# Patient Record
Sex: FEMALE | Race: WHITE | NOT HISPANIC OR LATINO | URBAN - METROPOLITAN AREA
[De-identification: names, ages, dates, MRNs, and addresses within clinical notes are randomized per-mention and may not be internally consistent; named-entity substitution may affect disease eponyms.]

---

## 2018-03-05 ENCOUNTER — OUTPATIENT (OUTPATIENT)
Dept: OUTPATIENT SERVICES | Facility: HOSPITAL | Age: 48
LOS: 1 days | Discharge: HOME | End: 2018-03-05

## 2018-03-05 DIAGNOSIS — R63.4 ABNORMAL WEIGHT LOSS: ICD-10-CM

## 2018-03-05 DIAGNOSIS — A69.20 LYME DISEASE, UNSPECIFIED: ICD-10-CM

## 2019-07-03 ENCOUNTER — OUTPATIENT (OUTPATIENT)
Dept: OUTPATIENT SERVICES | Facility: HOSPITAL | Age: 49
LOS: 1 days | Discharge: HOME | End: 2019-07-03

## 2019-07-03 DIAGNOSIS — D51.0 VITAMIN B12 DEFICIENCY ANEMIA DUE TO INTRINSIC FACTOR DEFICIENCY: ICD-10-CM

## 2019-07-03 DIAGNOSIS — Z00.00 ENCOUNTER FOR GENERAL ADULT MEDICAL EXAMINATION WITHOUT ABNORMAL FINDINGS: ICD-10-CM

## 2019-07-08 ENCOUNTER — OUTPATIENT (OUTPATIENT)
Dept: OUTPATIENT SERVICES | Facility: HOSPITAL | Age: 49
LOS: 1 days | Discharge: HOME | End: 2019-07-08

## 2019-07-08 DIAGNOSIS — N39.0 URINARY TRACT INFECTION, SITE NOT SPECIFIED: ICD-10-CM

## 2020-03-26 ENCOUNTER — TRANSCRIPTION ENCOUNTER (OUTPATIENT)
Age: 50
End: 2020-03-26

## 2022-04-07 PROBLEM — Z00.00 ENCOUNTER FOR PREVENTIVE HEALTH EXAMINATION: Status: ACTIVE | Noted: 2022-04-07

## 2022-07-19 ENCOUNTER — APPOINTMENT (OUTPATIENT)
Dept: PAIN MANAGEMENT | Facility: CLINIC | Age: 52
End: 2022-07-19

## 2022-07-28 ENCOUNTER — APPOINTMENT (OUTPATIENT)
Dept: ENDOCRINOLOGY | Facility: CLINIC | Age: 52
End: 2022-07-28

## 2022-07-28 ENCOUNTER — TRANSCRIPTION ENCOUNTER (OUTPATIENT)
Age: 52
End: 2022-07-28

## 2022-07-28 VITALS
HEART RATE: 72 BPM | DIASTOLIC BLOOD PRESSURE: 66 MMHG | OXYGEN SATURATION: 97 % | HEIGHT: 62 IN | BODY MASS INDEX: 15.64 KG/M2 | SYSTOLIC BLOOD PRESSURE: 104 MMHG | RESPIRATION RATE: 18 BRPM | WEIGHT: 85 LBS | TEMPERATURE: 97.4 F

## 2022-07-28 DIAGNOSIS — F32.A DEPRESSION, UNSPECIFIED: ICD-10-CM

## 2022-07-28 DIAGNOSIS — Z82.49 FAMILY HISTORY OF ISCHEMIC HEART DISEASE AND OTHER DISEASES OF THE CIRCULATORY SYSTEM: ICD-10-CM

## 2022-07-28 DIAGNOSIS — R63.0 ANOREXIA: ICD-10-CM

## 2022-07-28 DIAGNOSIS — Z78.9 OTHER SPECIFIED HEALTH STATUS: ICD-10-CM

## 2022-07-28 DIAGNOSIS — F17.200 NICOTINE DEPENDENCE, UNSPECIFIED, UNCOMPLICATED: ICD-10-CM

## 2022-07-28 DIAGNOSIS — F45.42 PAIN DISORDER WITH RELATED PSYCHOLOGICAL FACTORS: ICD-10-CM

## 2022-07-28 DIAGNOSIS — Z81.8 FAMILY HISTORY OF OTHER MENTAL AND BEHAVIORAL DISORDERS: ICD-10-CM

## 2022-07-28 DIAGNOSIS — Z83.511 FAMILY HISTORY OF GLAUCOMA: ICD-10-CM

## 2022-07-28 DIAGNOSIS — K21.00 GASTRO-ESOPHAGEAL REFLUX DISEASE WITH ESOPHAGITIS, WITHOUT BLEEDING: ICD-10-CM

## 2022-07-28 DIAGNOSIS — Z87.11 PERSONAL HISTORY OF PEPTIC ULCER DISEASE: ICD-10-CM

## 2022-07-28 DIAGNOSIS — F41.9 ANXIETY DISORDER, UNSPECIFIED: ICD-10-CM

## 2022-07-28 DIAGNOSIS — Z83.3 FAMILY HISTORY OF DIABETES MELLITUS: ICD-10-CM

## 2022-07-28 DIAGNOSIS — Z83.438 FAMILY HISTORY OF OTHER DISORDER OF LIPOPROTEIN METABOLISM AND OTHER LIPIDEMIA: ICD-10-CM

## 2022-07-28 DIAGNOSIS — Z80.9 FAMILY HISTORY OF MALIGNANT NEOPLASM, UNSPECIFIED: ICD-10-CM

## 2022-07-28 DIAGNOSIS — M81.0 AGE-RELATED OSTEOPOROSIS W/OUT CURRENT PATHOLOGICAL FRACTURE: ICD-10-CM

## 2022-07-28 PROCEDURE — 99204 OFFICE O/P NEW MOD 45 MIN: CPT

## 2022-07-28 RX ORDER — VALACYCLOVIR 500 MG/1
500 TABLET, FILM COATED ORAL
Qty: 60 | Refills: 0 | Status: ACTIVE | COMMUNITY
Start: 2022-06-14

## 2022-07-28 RX ORDER — AMOXICILLIN 250 MG/1
250 CAPSULE ORAL
Qty: 21 | Refills: 0 | Status: COMPLETED | COMMUNITY
Start: 2022-07-12

## 2022-07-28 RX ORDER — CLONAZEPAM 2 MG/1
2 TABLET ORAL
Qty: 30 | Refills: 0 | Status: ACTIVE | COMMUNITY
Start: 2022-02-08

## 2022-07-28 RX ORDER — ALENDRONATE SODIUM 70 MG/1
70 TABLET ORAL
Qty: 12 | Refills: 0 | Status: ACTIVE | COMMUNITY
Start: 2022-06-21

## 2022-07-28 RX ORDER — LEVOMILNACIPRAN HYDROCHLORIDE 120 MG/1
120 CAPSULE, EXTENDED RELEASE ORAL
Qty: 30 | Refills: 0 | Status: COMPLETED | COMMUNITY
Start: 2022-06-23

## 2022-07-28 RX ORDER — LAMOTRIGINE 25 MG/1
25 TABLET ORAL
Qty: 90 | Refills: 0 | Status: COMPLETED | COMMUNITY
Start: 2022-06-09

## 2022-07-28 RX ORDER — BUSPIRONE HYDROCHLORIDE 10 MG/1
10 TABLET ORAL
Qty: 60 | Refills: 0 | Status: ACTIVE | COMMUNITY
Start: 2022-02-10

## 2022-07-28 RX ORDER — TRAZODONE HYDROCHLORIDE 50 MG/1
50 TABLET ORAL
Qty: 60 | Refills: 0 | Status: COMPLETED | COMMUNITY
Start: 2022-02-01

## 2022-07-28 RX ORDER — BREXPIPRAZOLE 1 MG/1
1 TABLET ORAL
Qty: 30 | Refills: 0 | Status: COMPLETED | COMMUNITY
Start: 2022-01-20

## 2022-07-28 RX ORDER — SUCRALFATE 1 G/1
1 TABLET ORAL
Qty: 120 | Refills: 0 | Status: ACTIVE | COMMUNITY
Start: 2022-01-20

## 2022-07-28 RX ORDER — ACETAMINOPHEN AND CODEINE 300; 30 MG/1; MG/1
300-30 TABLET ORAL
Qty: 10 | Refills: 0 | Status: COMPLETED | COMMUNITY
Start: 2022-07-12

## 2022-07-28 RX ORDER — LEVOMILNACIPRAN HYDROCHLORIDE 120 MG/1
120 CAPSULE, EXTENDED RELEASE ORAL
Refills: 0 | Status: ACTIVE | COMMUNITY

## 2022-07-28 RX ORDER — PANTOPRAZOLE 40 MG/1
40 TABLET, DELAYED RELEASE ORAL
Qty: 30 | Refills: 0 | Status: ACTIVE | COMMUNITY
Start: 2022-02-14

## 2022-07-28 RX ORDER — FLUCONAZOLE 150 MG/1
150 TABLET ORAL
Qty: 2 | Refills: 0 | Status: COMPLETED | COMMUNITY
Start: 2022-01-26

## 2022-07-28 RX ORDER — AMOXICILLIN 500 MG/1
500 TABLET, FILM COATED ORAL
Qty: 28 | Refills: 0 | Status: COMPLETED | COMMUNITY
Start: 2022-06-30

## 2022-07-28 RX ORDER — CEPHALEXIN 500 MG/1
500 CAPSULE ORAL
Qty: 21 | Refills: 0 | Status: COMPLETED | COMMUNITY
Start: 2022-02-21

## 2022-07-28 NOTE — PHYSICAL EXAM
[Alert] : alert [No Acute Distress] : no acute distress [Well Developed] : well developed [Normal Sclera/Conjunctiva] : normal sclera/conjunctiva [EOMI] : extra ocular movement intact [No Proptosis] : no proptosis [PERRL] : pupils equal, round and reactive to light [Normal Outer Ear/Nose] : the ears and nose were normal in appearance [Normal Hearing] : hearing was normal [No Neck Mass] : no neck mass was observed [Supple] : the neck was supple [No Respiratory Distress] : no respiratory distress [No Accessory Muscle Use] : no accessory muscle use [Normal Rate and Effort] : normal respiratory rate and effort [Clear to Auscultation] : lungs were clear to auscultation bilaterally [Normal S1, S2] : normal S1 and S2 [Normal Rate] : heart rate was normal [Regular Rhythm] : with a regular rhythm [Carotids Normal] : carotid pulses were normal with no bruits [No Edema] : no peripheral edema [Pedal Pulses Normal] : the pedal pulses are present [Normal Bowel Sounds] : normal bowel sounds [Not Tender] : non-tender [Not Distended] : not distended [Soft] : abdomen soft [Normal Anterior Cervical Nodes] : no anterior cervical lymphadenopathy [Normal Posterior Cervical Nodes] : no posterior cervical lymphadenopathy [No CVA Tenderness] : no ~M costovertebral angle tenderness [No Spinal Tenderness] : no spinal tenderness [Spine Straight] : spine straight [Kyphosis] : no kyphosis present [Scoliosis] : scoliosis present [No Stigmata of Cushings Syndrome] : no stigmata of Cushings Syndrome [Normal Gait] : normal gait [Normal Strength/Tone] : muscle strength and tone were normal [No Rash] : no rash [Acanthosis Nigricans] : no acanthosis nigricans [Cranial Nerves Intact] : cranial nerves 2-12 were intact [No Motor Deficits] : the motor exam was normal [Normal Reflexes] : deep tendon reflexes were 2+ and symmetric [No Tremors] : no tremors [Oriented x3] : oriented to person, place, and time [Normal Affect] : the affect was normal [Normal Mood] : the mood was normal [de-identified] : thin [de-identified] : osteoporosis

## 2022-07-28 NOTE — HISTORY OF PRESENT ILLNESS
[Calcium (dietary)] : dietary Calcium [Vitamin D (sun exposure)] : Vitamin D from sun exposure [Alendronate (Fosomax)] : Alendronate [None] : The patient is not currently taking any medication for this problem. [Low Calcium Intake] : low calcium intake [Low Vit D Intake/Exposure] : low vitamin D intake [Premat. Menopause (natural)] : a history of premature menopause [Amenorrhea] : amenorrhea [Disordered Eating] : history of disordered eating [Sedentary] : a sedentary lifestyle [Current Smoking___(ppd)] : smoking [unfilled] ~Upacks per day [Regular Dental Follow-Up] : regular dental follow-up [FreeTextEntry1] : Patient had dexa scan and osteoporosis- Patient menopause at 42 years. Has history of anorexia x 30 years. \par Past 3 years under 90 pounds. Patient noted she pain when she eats, saw GI no ulcers at present time. \par Patient stated has Fosamax medication, but did not start medication yet. Discussed diet and she mentioned she eats when she is with company of outside. Discussed need to eat and increase calcium and vitamin D and weight bearing exercise. Discussed she needs to take the Fosamax weekly and to maintain upright x 4 hours after taking the medication.\par Patient stated she has a 16 year old daughter.\par  [Hyperparathyroidism] : no history of hyperparathyroidism [Diabetes] : no history of diabetes [Excessive Alcohol Intake] : no excessive alcohol intake [Excessive Soda] : no excessive soda [Family History of Breast Cancer] : no family history of breast cancer [Previous Fragility Fracture] : no previous fragility fracture [Family History of Hip Fracture] : no family history of hip fracture [Family History of Osteoporosis] : no family history of osteoporosis [History of Blood Clots] : no history of blood clots [History of Radiation Therapy] : no history of radiation therapy [High Fall Risk] : no fall risk [Uses a Walker/Cane] : no use of a walker/cane

## 2022-07-28 NOTE — RESULTS/DATA
[L1 - L4] : L1 - L4 [BMD ___ g/cm2] : BMD: [unfilled] g/cm2 [T-Score ___] : T-score: [unfilled] [Z-Score ___] : Z-score: [unfilled] [FreeTextEntry2] : 6/13/22 [FreeTextEntry1] : h/o anorexia, bulimia, complex regional pain syndrome right hand, menopause age 42, drinks a lot of coffee

## 2022-07-28 NOTE — DATA REVIEWED
[No studies available for review at this time.] : No studies available for review at this time. [FreeTextEntry1] : 6/22/21 chol 189, hdl 63, trig 102, ldl 115, hgba 1c

## 2022-07-28 NOTE — REVIEW OF SYSTEMS
[Fatigue] : fatigue [Decreased Appetite] : decreased appetite [Recent Weight Loss (___ Lbs)] : recent weight loss: [unfilled] lbs [Abdominal Pain] : abdominal pain [Heartburn] : heartburn [Joint Pain] : joint pain [Hair Loss] : hair loss [Dizziness] : dizziness [Pain/Numbness of Digits] : pain/numbness of digits [Depression] : depression [Insomnia] : insomnia [Anxiety] : anxiety [Stress] : stress [Negative] : Heme/Lymph [Suicidal Ideation] : no suicidal ideation [Homicidal Ideation] : no homicidal ideation [FreeTextEntry9] : right upper extremity, carpal tunnel, pain in both legs aching [de-identified] : anorexia [de-identified] : osteoporosis

## 2022-07-28 NOTE — REASON FOR VISIT
[Initial Evaluation] : an initial evaluation [Osteoporosis] : osteoporosis [FreeTextEntry2] : Dr. LUCERO

## 2022-07-28 NOTE — ASSESSMENT
[Bisphosphonate Therapy] : Risks and benefits of bisphosphonate therapy were  discussed with the patient including gastroesophageal irritation, osteonecrosis of the jaw, and atypical femur fractures, and acute phase reaction [Bisphosphonates] : The patient was instructed to take bisphosphonates on an empty stomach with a full glass of water,and wait at least 30 minutes before eating or lying down [FreeTextEntry1] : patient teaching of diet, need to eat food high in calories, need to decrease caffeine intake and increase calcium and vitamin d. Patient taking pantoprazole and sucralfate, may interfere in calcium absorption.

## 2022-07-28 NOTE — PAST MEDICAL HISTORY
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

## 2022-08-04 ENCOUNTER — APPOINTMENT (OUTPATIENT)
Dept: PAIN MANAGEMENT | Facility: CLINIC | Age: 52
End: 2022-08-04

## 2022-08-04 VITALS
WEIGHT: 85 LBS | HEIGHT: 62 IN | BODY MASS INDEX: 15.64 KG/M2 | HEART RATE: 97 BPM | SYSTOLIC BLOOD PRESSURE: 101 MMHG | DIASTOLIC BLOOD PRESSURE: 69 MMHG

## 2022-08-04 DIAGNOSIS — M79.675 PAIN IN LEFT TOE(S): ICD-10-CM

## 2022-08-04 PROCEDURE — 80305 DRUG TEST PRSMV DIR OPT OBS: CPT | Mod: QW

## 2022-08-04 PROCEDURE — 99214 OFFICE O/P EST MOD 30 MIN: CPT

## 2022-08-04 PROCEDURE — 99072 ADDL SUPL MATRL&STAF TM PHE: CPT

## 2022-08-04 NOTE — PHYSICAL EXAM
[de-identified] : Constitutional:  The patient appears well developed, well nourished.  Examination of patients ability to communicate functionally was normal. \par \par Skin:  Skin of the head and face is normal without rashes, lesions or ulcers. Skin of the right upper extremity is normal without rashes, lesions or ulcers.  Skin of the left lower extremities is normal without rashes, lesions, or ulcers. Skin of the right lower extremity is normal without rashes, lesions or ulcers.  Skin of the left upper extremity is normal without rashes, lesions, or ulcers. \par \par Neurologic: Coordination is normal.  normal.  Alert and oriented to time, place and person.  Grossly intact.  No evidence of mood disorder, calm affect. \par \par Right Hand: Inspection of the hand/wrist is as follows: Right hand is swollen and color discoloration with hyperalgesia and allodynia. Severe decreased ROM.

## 2022-08-04 NOTE — DISCUSSION/SUMMARY
[de-identified] : PLAN: This is a 51 yo F who suffers from chronic pain within the right upper extremity. She had failed SCS and does not wish to re-trial high frequency. WE will continue Gabapentin at a dosage of 300mg PO 1qAM 2qPM. She has not been able to work. She would like to retrial medical marijuana, rx was reinstated. UDS completed today and consistent. She will follow up in 3 months. \par \par Claudia Dick, MS PA-C\par Satish Soni,   \par Diplomate, American Board of Anesthesiology  \par Diplomate, American Board of Pain Medicine\par \par

## 2022-08-04 NOTE — HISTORY OF PRESENT ILLNESS
[FreeTextEntry1] : ORIGINAL PRESENTATION: Ms. Amaya had a work-related injury on May 7, 2013, where she worked as a , her hand went numb. She had carpal tunnel release surgery. Since then she had surgery on the right hand and developed complex regional syndrome and now has left hand numbness due to carpal tunnel, that is chronic. She has chronic neuropathy in her left hand and chronic complex regional pain syndrome, difficult problem. \par \par She recently initiated therapy with medical marijuana sublingual 0.5ml once a day, she notes improved sleeping habits. Minor relief with pain but it is getting better.\par \par ACTIVITIES: No real problems with walking, sitting, or standing. Never lies down. Uses no assistive device. Has difficultly performing household chores, doing shopping, socializing with friends, participating in activities and exercising.\par \par TODAY:: She presents for follow up regarding within the right upper extremity. The site is red at this time, tender to touch, with peeling to the skin noted. Amitriptyline and Lyrica failed to provide relief and she remains hesitant to consider SCS. With that, I have agreed to resume her gabapentin dosing. She gabapentin 300mg TID dosing to aid with pain control and quality of life. She is agreeable to trial this change and I will revisit with her in 2-3 months for continued care/treatment efforts.  We discussed re-trialing medical marijuana now that there are more variations available including edibles, topicals and vape. Previously when she tried it she did not like the sublingual oil. \par \par Of note she was recently diagnosed with osteoporosis and put on fosamax. She fell recently and injured her left 4th toe. \par \par UDS completed today 8/4/22

## 2022-08-12 LAB
24R-OH-CALCIDIOL SERPL-MCNC: 40.8 PG/ML
ALBUMIN SERPL ELPH-MCNC: 4.7 G/DL
ALP BLD-CCNC: 60 U/L
ALT SERPL-CCNC: 14 U/L
ANION GAP SERPL CALC-SCNC: 14 MMOL/L
APPEARANCE: ABNORMAL
AST SERPL-CCNC: 27 U/L
BACTERIA UR CULT: NORMAL
BASOPHILS # BLD AUTO: 0.07 K/UL
BASOPHILS NFR BLD AUTO: 0.8 %
BILIRUB SERPL-MCNC: 0.4 MG/DL
BILIRUBIN URINE: NEGATIVE
BLOOD URINE: NEGATIVE
BUN SERPL-MCNC: 14 MG/DL
CALCIUM SERPL-MCNC: 10.1 MG/DL
CALCIUM SERPL-MCNC: 9.9 MG/DL
CHLORIDE SERPL-SCNC: 104 MMOL/L
CHOLEST SERPL-MCNC: 201 MG/DL
CO2 SERPL-SCNC: 26 MMOL/L
COLOR: YELLOW
CREAT SERPL-MCNC: 0.9 MG/DL
EGFR: 78 ML/MIN/1.73M2
EOSINOPHIL # BLD AUTO: 0.25 K/UL
EOSINOPHIL NFR BLD AUTO: 2.9 %
ESTIMATED AVERAGE GLUCOSE: 108 MG/DL
FOLATE SERPL-MCNC: >20 NG/ML
GLUCOSE QUALITATIVE U: NEGATIVE
GLUCOSE SERPL-MCNC: 86 MG/DL
HBA1C MFR BLD HPLC: 5.4 %
HCT VFR BLD CALC: 41.3 %
HDLC SERPL-MCNC: 65 MG/DL
HGB BLD-MCNC: 13.3 G/DL
IMM GRANULOCYTES NFR BLD AUTO: 0.4 %
KETONES URINE: NEGATIVE
LDLC SERPL CALC-MCNC: 109 MG/DL
LEUKOCYTE ESTERASE URINE: NEGATIVE
LYMPHOCYTES # BLD AUTO: 2.2 K/UL
LYMPHOCYTES NFR BLD AUTO: 25.8 %
MAN DIFF?: NORMAL
MCHC RBC-ENTMCNC: 31.7 PG
MCHC RBC-ENTMCNC: 32.2 G/DL
MCV RBC AUTO: 98.6 FL
MONOCYTES # BLD AUTO: 0.45 K/UL
MONOCYTES NFR BLD AUTO: 5.3 %
NEUTROPHILS # BLD AUTO: 5.52 K/UL
NEUTROPHILS NFR BLD AUTO: 64.8 %
NITRITE URINE: NEGATIVE
NONHDLC SERPL-MCNC: 136 MG/DL
PARATHYROID HORMONE INTACT: 35 PG/ML
PH URINE: 6
PHOSPHATE SERPL-MCNC: 3.8 MG/DL
PLATELET # BLD AUTO: 304 K/UL
POTASSIUM SERPL-SCNC: 4.8 MMOL/L
PROT SERPL-MCNC: 7.4 G/DL
PROTEIN URINE: NORMAL
RBC # BLD: 4.19 M/UL
RBC # FLD: 13.3 %
SODIUM SERPL-SCNC: 144 MMOL/L
SPECIFIC GRAVITY URINE: 1.02
T4 FREE SERPL-MCNC: 1 NG/DL
THYROGLOB AB SERPL-ACNC: <20 IU/ML
THYROPEROXIDASE AB SERPL IA-ACNC: <10 IU/ML
TRIGL SERPL-MCNC: 137 MG/DL
TSH SERPL-ACNC: 1.39 UIU/ML
UROBILINOGEN URINE: NORMAL
VIT B12 SERPL-MCNC: 1170 PG/ML
WBC # FLD AUTO: 8.52 K/UL

## 2022-11-01 ENCOUNTER — APPOINTMENT (OUTPATIENT)
Dept: ENDOCRINOLOGY | Facility: CLINIC | Age: 52
End: 2022-11-01

## 2022-11-09 ENCOUNTER — LABORATORY RESULT (OUTPATIENT)
Age: 52
End: 2022-11-09

## 2022-11-09 ENCOUNTER — APPOINTMENT (OUTPATIENT)
Dept: PAIN MANAGEMENT | Facility: CLINIC | Age: 52
End: 2022-11-09

## 2022-11-09 PROCEDURE — 99072 ADDL SUPL MATRL&STAF TM PHE: CPT

## 2022-11-09 PROCEDURE — 99214 OFFICE O/P EST MOD 30 MIN: CPT

## 2022-11-09 NOTE — PHYSICAL EXAM
[de-identified] : GENERAL APPEARANCE OF PATIENT IS WELL DEVELOPED, WELL NOURISHED, BODY HABITUS NORMAL, WELL GROOMED, NO DEFORMITIES NOTED. \par Head - Atraumatic and Normocephalic \par Eyes, Nose, and Throat: External inspection of ears and nose are normal overall without scars, lesions, or masses noted. Assessment of hearing is normal\par Neck-Examination of neck shows no masses, overall appearance is normal, neck is symmetric, tracheal position is midline, no crepitus is noted. Examination of thyroid shows no enlargement, tenderness or masses\par Respiratory- Assessment of respiratory effort shows no intercostal retractions, no use of accessory muscles, unlabored breathing, and normal diaphragmatic movement.\par Cardiovascular- Examination of extremities show no edema or varicosities\par Musculoskeletal. Examination of gait is not antalgic and station is normal\par Inspection and palpation of digits and nails shows no clubbing, cyanosis, nodules, drainage, fluctuance, petechiae\par \par • Spine – inspection and palpation shows no misalignment, asymmetry, crepitation, defects, tenderness, masses, effusions. ROM is normal without crepitation or contracture. No instability or subluxation or laxity is noted. No abnormal movements.\par \par \par • Neck- inspection and palpation shows no misalignment, asymmetry, crepitation, defects, tenderness, masses, effusions. ROM is normal without crepitation or contracture. No instability or subluxation or laxity is noted. No abnormal movements.\par \par \par • RUE- Right Hand: Inspection of the hand/wrist is as follows: Right hand is swollen and color discoloration with hyperalgesia and allodynia. Severe decreased ROM.\par \par \par • LUE- inspection and palpation shows no misalignment, asymmetry, crepitation, defects, tenderness, masses, effusions. ROM is normal without crepitation or contracture. No instability or subluxation or laxity is noted. No abnormal movements.\par \par \par • RLE- inspection and palpation shows no misalignment, asymmetry, crepitation, defects, tenderness, masses, effusions. ROM is normal without crepitation or contracture. No instability or subluxation or laxity is noted. No abnormal movements.\par \par \par • LLE- inspection and palpation shows no misalignment, asymmetry, crepitation, defects, tenderness, masses, effusions. ROM is normal without crepitation or contracture. No instability or subluxation or laxity is noted. No abnormal movements.\par \par \par • Skin- Inspection of skin and subcutaneous tissue shows no rashes, lesions or ulcers. Palpation of skin and subcutaneous tissue shows no rashes, no indurations, subcutaneous nodules or tightening.\par \par \par • Abdomen- Nontender\par \par \par • Neurologic- CN 2-12 are grossly intact. No sensory or motor deficits in the upper and lower extremities. Adequate strength in upper and lower extremities \par \par \par • Psychiatric- Patient’s judgment and insight are intact. Oriented to time, place and person. Recent and remote memory intact.\par \par \par

## 2022-11-09 NOTE — PHYSICAL EXAM
[de-identified] : GENERAL APPEARANCE OF PATIENT IS WELL DEVELOPED, WELL NOURISHED, BODY HABITUS NORMAL, WELL GROOMED, NO DEFORMITIES NOTED. \par Head - Atraumatic and Normocephalic \par Eyes, Nose, and Throat: External inspection of ears and nose are normal overall without scars, lesions, or masses noted. Assessment of hearing is normal\par Neck-Examination of neck shows no masses, overall appearance is normal, neck is symmetric, tracheal position is midline, no crepitus is noted. Examination of thyroid shows no enlargement, tenderness or masses\par Respiratory- Assessment of respiratory effort shows no intercostal retractions, no use of accessory muscles, unlabored breathing, and normal diaphragmatic movement.\par Cardiovascular- Examination of extremities show no edema or varicosities\par Musculoskeletal. Examination of gait is not antalgic and station is normal\par Inspection and palpation of digits and nails shows no clubbing, cyanosis, nodules, drainage, fluctuance, petechiae\par \par • Spine – inspection and palpation shows no misalignment, asymmetry, crepitation, defects, tenderness, masses, effusions. ROM is normal without crepitation or contracture. No instability or subluxation or laxity is noted. No abnormal movements.\par \par \par • Neck- inspection and palpation shows no misalignment, asymmetry, crepitation, defects, tenderness, masses, effusions. ROM is normal without crepitation or contracture. No instability or subluxation or laxity is noted. No abnormal movements.\par \par \par • RUE- Right Hand: Inspection of the hand/wrist is as follows: Right hand is swollen and color discoloration with hyperalgesia and allodynia. Severe decreased ROM.\par \par \par • LUE- inspection and palpation shows no misalignment, asymmetry, crepitation, defects, tenderness, masses, effusions. ROM is normal without crepitation or contracture. No instability or subluxation or laxity is noted. No abnormal movements.\par \par \par • RLE- inspection and palpation shows no misalignment, asymmetry, crepitation, defects, tenderness, masses, effusions. ROM is normal without crepitation or contracture. No instability or subluxation or laxity is noted. No abnormal movements.\par \par \par • LLE- inspection and palpation shows no misalignment, asymmetry, crepitation, defects, tenderness, masses, effusions. ROM is normal without crepitation or contracture. No instability or subluxation or laxity is noted. No abnormal movements.\par \par \par • Skin- Inspection of skin and subcutaneous tissue shows no rashes, lesions or ulcers. Palpation of skin and subcutaneous tissue shows no rashes, no indurations, subcutaneous nodules or tightening.\par \par \par • Abdomen- Nontender\par \par \par • Neurologic- CN 2-12 are grossly intact. No sensory or motor deficits in the upper and lower extremities. Adequate strength in upper and lower extremities \par \par \par • Psychiatric- Patient’s judgment and insight are intact. Oriented to time, place and person. Recent and remote memory intact.\par \par \par

## 2022-11-09 NOTE — DISCUSSION/SUMMARY
[de-identified] : Miss Calli Amaya is a 51 yo F who suffers from chronic pain within the right upper extremity secondary to complex regional pain syndrome. Pain resulted from a work related injury. She had failed SCS and does not wish to re-trial high frequency. We will continue Gabapentin at a dosage of 300mg TID. She will also try medical marijuana at this time. She has not been able to work. UDS completed today and consistent. She will follow up in 4 months. \par \par UDs to be repeated today.\par \par I, Juanita Calixto, attest that this documentation has been prepared under the direction and in the presence of Provider Satish Soni DO\par The documentation recorded by the scribe, in my presence, accurately reflects the service I personally performed, and the decisions made by me with my edits as appropriate.\par \par Best Regards, \par Satish Soni, D.O. \par Diplomat, American Board of Anesthesiology \par Diplomat, American Board of Pain Medicine \par Diplomat, American Board of Pain Management \par \par

## 2022-11-09 NOTE — HISTORY OF PRESENT ILLNESS
[FreeTextEntry1] : ORIGINAL PRESENTATION: Ms. Amaya had a work-related injury on May 7, 2013, where she worked as a , her hand went numb. She had carpal tunnel release surgery. Since then she had surgery on the right hand and developed complex regional syndrome and now has left hand numbness due to carpal tunnel, that is chronic. She has chronic neuropathy in her left hand and chronic complex regional pain syndrome, difficult problem. \par \par She recently initiated therapy with medical marijuana sublingual 0.5ml once a day, she notes improved sleeping habits. Minor relief with pain but it is getting better.\par \par ACTIVITIES: No real problems with walking, sitting, or standing. Never lies down. Uses no assistive device. Has difficultly performing household chores, doing shopping, socializing with friends, participating in activities and exercising.\par \par TODAY: She presents for follow up regarding right upper extremity pain secondary to complex regional pain syndrome. She previously managed her pain medically with Amitriptyline, Cymbalta and Lyrica which failed to provide relief and she remains hesitant to consider SCS. She now continues to manage her pain with gabapentin 300mg TID which helps her to sleep. She was prescribed medical marijuana however she did not try the medication.\par \par Of note she was recently diagnosed with osteoporosis and put on fosamax. She fell recently and injured her left 4th toe. \par \par Covid 19 - This in-office encounter has occurred during a Public Health Emergency (PHE). As required by law, due to the risk of respiratory-transmitted infectious diseases, our office provided additional materials, supplies and clinical staff to assist in keeping our patients, physicians and office staff safe during this health emergency.\par UDS completed today 8/4/22

## 2022-11-09 NOTE — DISCUSSION/SUMMARY
[de-identified] : Miss Calli Amaya is a 51 yo F who suffers from chronic pain within the right upper extremity secondary to complex regional pain syndrome. Pain resulted from a work related injury. She had failed SCS and does not wish to re-trial high frequency. We will continue Gabapentin at a dosage of 300mg TID. She will also try medical marijuana at this time. She has not been able to work. UDS completed today and consistent. She will follow up in 4 months. \par \par UDs to be repeated today.\par \par I, Juanita Calixto, attest that this documentation has been prepared under the direction and in the presence of Provider Satish Soni DO\par The documentation recorded by the scribe, in my presence, accurately reflects the service I personally performed, and the decisions made by me with my edits as appropriate.\par \par Best Regards, \par Satish Soni, D.O. \par Diplomat, American Board of Anesthesiology \par Diplomat, American Board of Pain Medicine \par Diplomat, American Board of Pain Management \par \par

## 2022-12-12 ENCOUNTER — APPOINTMENT (OUTPATIENT)
Dept: ENDOCRINOLOGY | Facility: CLINIC | Age: 52
End: 2022-12-12

## 2023-03-01 ENCOUNTER — APPOINTMENT (OUTPATIENT)
Dept: PAIN MANAGEMENT | Facility: CLINIC | Age: 53
End: 2023-03-01
Payer: OTHER MISCELLANEOUS

## 2023-03-01 ENCOUNTER — LABORATORY RESULT (OUTPATIENT)
Age: 53
End: 2023-03-01

## 2023-03-01 VITALS — BODY MASS INDEX: 15.64 KG/M2 | WEIGHT: 85 LBS | HEIGHT: 62 IN

## 2023-03-01 PROCEDURE — 80305 DRUG TEST PRSMV DIR OPT OBS: CPT | Mod: QW

## 2023-03-01 PROCEDURE — 99072 ADDL SUPL MATRL&STAF TM PHE: CPT

## 2023-03-01 PROCEDURE — 99214 OFFICE O/P EST MOD 30 MIN: CPT

## 2023-03-01 RX ORDER — IBUPROFEN 800 MG/1
800 TABLET, FILM COATED ORAL
Qty: 30 | Refills: 4 | Status: ACTIVE | COMMUNITY
Start: 2023-03-01 | End: 1900-01-01

## 2023-03-02 LAB
AMP / AMPHETAMINE: NEGATIVE
BAR / SECOBARBITAL: NEGATIVE
BUP / BUPRENORPHINE: NEGATIVE
BZO / OXAZEPAM: POSITIVE
COC / COCAINE: NEGATIVE
CREATININE: 200 MG/DL
MDMA / METHYLENEDIOXYMETHAMPHETAMINE: NEGATIVE
MET / METHAMPHETAMINES: NEGATIVE
MOP / MORPHINE: NEGATIVE
MTD / METHADONE: NEGATIVE
OXY / OXYCODONE: NEGATIVE
PCP / PHENCYCLIDINE: NEGATIVE
PH: 7
SPECIFIC GRAVITY: 1.02
TEMPERATURE: 90 C
THC / MARIJUANA: POSITIVE

## 2023-03-02 NOTE — DISCUSSION/SUMMARY
[de-identified] : Miss Calli Amaya is a 53 yo F who suffers from chronic pain within the right upper extremity secondary to complex regional pain syndrome. Pain resulted from a work related injury. She had failed SCS and does not wish to re-trial high frequency. We will continue Gabapentin at a dosage of 300mg TID. She will also continue with medical marijuana at this time, as it helps with sleep. She has not been able to work. UDS completed today and consistent. She will follow up in 4 months. \par \par UDs to be repeated today.\par \par I, Juanita Claixto, attest that this documentation has been prepared under the direction and in the presence of Provider Satish Soni DO\par The documentation recorded by the scribe, in my presence, accurately reflects the service I personally performed, and the decisions made by me with my edits as appropriate.\par \par Best Regards, \par Satish Soni, D.O. \par Diplomat, American Board of Anesthesiology \par Diplomat, American Board of Pain Medicine \par Diplomat, American Board of Pain Management \par \par

## 2023-03-02 NOTE — HISTORY OF PRESENT ILLNESS
[FreeTextEntry1] : ORIGINAL PRESENTATION: Ms. Amaya had a work-related injury on May 7, 2013, where she worked as a , her hand went numb. She had carpal tunnel release surgery. Since then she had surgery on the right hand and developed complex regional syndrome and now has left hand numbness due to carpal tunnel, that is chronic. She has chronic neuropathy in her left hand and chronic complex regional pain syndrome, difficult problem. \par \par She recently initiated therapy with medical marijuana sublingual 0.5ml once a day, she notes improved sleeping habits. Minor relief with pain but it is getting better.\par \par ACTIVITIES: No real problems with walking, sitting, or standing. Never lies down. Uses no assistive device. Has difficultly performing household chores, doing shopping, socializing with friends, participating in activities and exercising.\par \par TODAY: She presents for follow up regarding right upper extremity pain secondary to complex regional pain syndrome. She previously managed her pain medically with Amitriptyline, Cymbalta and Lyrica which failed to provide relief and she remains hesitant to consider SCS. She now continues to manage her pain with gabapentin 300mg TID which helps her to sleep. She has trialed medical marijuana and it helps with sleep. \par \par Of note she was recently diagnosed with osteoporosis and put on fosamax. \par \par Covid 19 - This in-office encounter has occurred during a Public Health Emergency (PHE). As required by law, due to the risk of respiratory-transmitted infectious diseases, our office provided additional materials, supplies and clinical staff to assist in keeping our patients, physicians and office staff safe during this health emergency.\par UDS completed today 8/4/22

## 2023-03-02 NOTE — PHYSICAL EXAM
[de-identified] : GENERAL APPEARANCE OF PATIENT IS WELL DEVELOPED, WELL NOURISHED, BODY HABITUS NORMAL, WELL GROOMED, NO DEFORMITIES NOTED. \par Head - Atraumatic and Normocephalic \par Eyes, Nose, and Throat: External inspection of ears and nose are normal overall without scars, lesions, or masses noted. Assessment of hearing is normal\par Neck-Examination of neck shows no masses, overall appearance is normal, neck is symmetric, tracheal position is midline, no crepitus is noted. Examination of thyroid shows no enlargement, tenderness or masses\par Respiratory- Assessment of respiratory effort shows no intercostal retractions, no use of accessory muscles, unlabored breathing, and normal diaphragmatic movement.\par Cardiovascular- Examination of extremities show no edema or varicosities\par Musculoskeletal. Examination of gait is not antalgic and station is normal\par Inspection and palpation of digits and nails shows no clubbing, cyanosis, nodules, drainage, fluctuance, petechiae\par \par • Spine – inspection and palpation shows no misalignment, asymmetry, crepitation, defects, tenderness, masses, effusions. ROM is normal without crepitation or contracture. No instability or subluxation or laxity is noted. No abnormal movements.\par \par \par • Neck- inspection and palpation shows no misalignment, asymmetry, crepitation, defects, tenderness, masses, effusions. ROM is normal without crepitation or contracture. No instability or subluxation or laxity is noted. No abnormal movements.\par \par \par • RUE- Right Hand: Inspection of the hand/wrist is as follows: Right hand is swollen and color discoloration with hyperalgesia and allodynia. Severe decreased ROM.\par \par \par • LUE- allodynia, hyperalgesia, and increased vascular flow of the left upper extremity.  \par \par \par • RLE- inspection and palpation shows no misalignment, asymmetry, crepitation, defects, tenderness, masses, effusions. ROM is normal without crepitation or contracture. No instability or subluxation or laxity is noted. No abnormal movements.\par \par \par • LLE- inspection and palpation shows no misalignment, asymmetry, crepitation, defects, tenderness, masses, effusions. ROM is normal without crepitation or contracture. No instability or subluxation or laxity is noted. No abnormal movements.\par \par \par • Skin- Inspection of skin and subcutaneous tissue shows no rashes, lesions or ulcers. Palpation of skin and subcutaneous tissue shows no rashes, no indurations, subcutaneous nodules or tightening.\par \par \par • Abdomen- Nontender\par \par \par • Neurologic- CN 2-12 are grossly intact. No sensory or motor deficits in the upper and lower extremities. Adequate strength in upper and lower extremities \par \par \par • Psychiatric- Patient’s judgment and insight are intact. Oriented to time, place and person. Recent and remote memory intact.\par \par \par

## 2023-07-05 ENCOUNTER — APPOINTMENT (OUTPATIENT)
Dept: PAIN MANAGEMENT | Facility: CLINIC | Age: 53
End: 2023-07-05

## 2023-08-03 ENCOUNTER — APPOINTMENT (OUTPATIENT)
Dept: PAIN MANAGEMENT | Facility: CLINIC | Age: 53
End: 2023-08-03
Payer: OTHER MISCELLANEOUS

## 2023-08-03 VITALS — DIASTOLIC BLOOD PRESSURE: 68 MMHG | SYSTOLIC BLOOD PRESSURE: 89 MMHG | HEART RATE: 98 BPM

## 2023-08-03 VITALS — WEIGHT: 85 LBS | HEIGHT: 62 IN | BODY MASS INDEX: 15.64 KG/M2

## 2023-08-03 PROCEDURE — 99213 OFFICE O/P EST LOW 20 MIN: CPT | Mod: ACP

## 2023-08-03 NOTE — PHYSICAL EXAM
[de-identified] : GENERAL APPEARANCE OF PATIENT IS WELL DEVELOPED, WELL NOURISHED, BODY HABITUS NORMAL, WELL GROOMED, NO DEFORMITIES NOTED. \par  Head - Atraumatic and Normocephalic \par  Eyes, Nose, and Throat: External inspection of ears and nose are normal overall without scars, lesions, or masses noted. Assessment of hearing is normal\par  Neck-Examination of neck shows no masses, overall appearance is normal, neck is symmetric, tracheal position is midline, no crepitus is noted. Examination of thyroid shows no enlargement, tenderness or masses\par  Respiratory- Assessment of respiratory effort shows no intercostal retractions, no use of accessory muscles, unlabored breathing, and normal diaphragmatic movement.\par  Cardiovascular- Examination of extremities show no edema or varicosities\par  Musculoskeletal. Examination of gait is not antalgic and station is normal\par  Inspection and palpation of digits and nails shows no clubbing, cyanosis, nodules, drainage, fluctuance, petechiae\par  \par  - Spine - inspection and palpation shows no misalignment, asymmetry, crepitation, defects, tenderness, masses, effusions. ROM is normal without crepitation or contracture. No instability or subluxation or laxity is noted. No abnormal movements.\par  \par  \par  - Neck- inspection and palpation shows no misalignment, asymmetry, crepitation, defects, tenderness, masses, effusions. ROM is normal without crepitation or contracture. No instability or subluxation or laxity is noted. No abnormal movements.\par  \par  \par  - RUE- Right Hand: Inspection of the hand/wrist is as follows: Right hand is swollen and color discoloration with hyperalgesia and allodynia. Severe decreased ROM.\par  \par  \par  - LUE- allodynia, hyperalgesia, and increased vascular flow of the left upper extremity.  \par  \par  \par  - RLE- inspection and palpation shows no misalignment, asymmetry, crepitation, defects, tenderness, masses, effusions. ROM is normal without crepitation or contracture. No instability or subluxation or laxity is noted. No abnormal movements.\par  \par  \par  - LLE- inspection and palpation shows no misalignment, asymmetry, crepitation, defects, tenderness, masses, effusions. ROM is normal without crepitation or contracture. No instability or subluxation or laxity is noted. No abnormal movements.\par  \par  \par  - Skin- Inspection of skin and subcutaneous tissue shows no rashes, lesions or ulcers. Palpation of skin and subcutaneous tissue shows no rashes, no indurations, subcutaneous nodules or tightening.\par  \par  \par  - Abdomen- Nontender\par  \par  \par  - Neurologic- CN 2-12 are grossly intact. No sensory or motor deficits in the upper and lower extremities. Adequate strength in upper and lower extremities \par  \par  \par  - Psychiatric- Patient's judgment and insight are intact. Oriented to time, place and person. Recent and remote memory intact.\par  \par  \par

## 2023-08-03 NOTE — ASSESSMENT
[FreeTextEntry1] : Miss Calli Amaya is a 51 yo F who suffers from chronic pain within the right upper extremity secondary to complex regional pain syndrome. Pain resulted from a work-related injury. She had failed SCS and does not wish to re-trial high frequency. We will continue Gabapentin at a dosage of 300mg TID. She will also continue with medical marijuana at this time, as it helps with sleep. She has not been able to work. She will be visiting a facility for a 2-month period due to anorexia. She will follow up in 3 months.   Overall there is at least a 30-50% reduction in pain with the prescribed analgesics. The patient denies any adverse side effects due to the medication (sleeping disturbance, constipation, sleepiness, hallucinations and/or urination problems).  PATTY Naqvi D.O

## 2023-08-03 NOTE — HISTORY OF PRESENT ILLNESS
[FreeTextEntry1] : ORIGINAL PRESENTATION: Ms. Amaya had a work-related injury on May 7, 2013, where she worked as a , her hand went numb. She had carpal tunnel release surgery. Since then she had surgery on the right hand and developed complex regional syndrome and now has left hand numbness due to carpal tunnel, that is chronic. She has chronic neuropathy in her left hand and chronic complex regional pain syndrome, difficult problem.   She recently initiated therapy with medical marijuana sublingual 0.5ml once a day, she notes improved sleeping habits. Minor relief with pain but it is getting better.  ACTIVITIES: No real problems with walking, sitting, or standing. Never lies down. Uses no assistive device. Has difficultly performing household chores, doing shopping, socializing with friends, participating in activities and exercising.  TODAY: Patient last seen on 3/01/23. She presents for follow up regarding right upper extremity pain secondary to complex regional pain syndrome. She reports no acute changes to her condition. She previously managed her pain medically with Amitriptyline, Cymbalta and Lyrica which failed to provide relief. She now continues to manage her pain with gabapentin 300mg TID which helps her to sleep. She utilizes medical marijuana which provides her relief throughout the day and allows for adequate sleep. She will be visiting a facility for a 2-month period due to anorexia.  Of note she was recently diagnosed with osteoporosis and put on fosamax.   UDS- 8/4/22

## 2023-08-16 ENCOUNTER — APPOINTMENT (OUTPATIENT)
Dept: UROLOGY | Facility: CLINIC | Age: 53
End: 2023-08-16

## 2023-09-14 ENCOUNTER — APPOINTMENT (OUTPATIENT)
Dept: NEUROLOGY | Facility: CLINIC | Age: 53
End: 2023-09-14
Payer: MEDICARE

## 2023-09-14 VITALS
WEIGHT: 88 LBS | HEIGHT: 62 IN | SYSTOLIC BLOOD PRESSURE: 90 MMHG | BODY MASS INDEX: 16.2 KG/M2 | DIASTOLIC BLOOD PRESSURE: 64 MMHG | HEART RATE: 71 BPM

## 2023-09-14 DIAGNOSIS — M54.16 RADICULOPATHY, LUMBAR REGION: ICD-10-CM

## 2023-09-14 DIAGNOSIS — Z87.39 PERSONAL HISTORY OF OTHER DISEASES OF THE MUSCULOSKELETAL SYSTEM AND CONNECTIVE TISSUE: ICD-10-CM

## 2023-09-14 DIAGNOSIS — Z86.69 PERSONAL HISTORY OF OTHER DISEASES OF THE NERVOUS SYSTEM AND SENSE ORGANS: ICD-10-CM

## 2023-09-14 DIAGNOSIS — Z86.59 PERSONAL HISTORY OF OTHER MENTAL AND BEHAVIORAL DISORDERS: ICD-10-CM

## 2023-09-14 PROCEDURE — 99204 OFFICE O/P NEW MOD 45 MIN: CPT

## 2023-09-18 ENCOUNTER — APPOINTMENT (OUTPATIENT)
Dept: NEUROLOGY | Facility: CLINIC | Age: 53
End: 2023-09-18

## 2023-10-16 ENCOUNTER — APPOINTMENT (OUTPATIENT)
Dept: NEUROLOGY | Facility: CLINIC | Age: 53
End: 2023-10-16

## 2023-11-08 ENCOUNTER — APPOINTMENT (OUTPATIENT)
Dept: PAIN MANAGEMENT | Facility: CLINIC | Age: 53
End: 2023-11-08
Payer: OTHER MISCELLANEOUS

## 2023-11-08 DIAGNOSIS — G90.50 COMPLEX REGIONAL PAIN SYNDROME I, UNSPECIFIED: ICD-10-CM

## 2023-11-08 DIAGNOSIS — G89.29 OTHER CHRONIC PAIN: ICD-10-CM

## 2023-11-08 PROCEDURE — 99442: CPT

## 2023-11-08 RX ORDER — GABAPENTIN 300 MG/1
300 CAPSULE ORAL
Qty: 270 | Refills: 0 | Status: DISCONTINUED | COMMUNITY
Start: 2022-05-24 | End: 2023-11-08

## 2023-11-08 RX ORDER — IBUPROFEN 800 MG/1
800 TABLET, FILM COATED ORAL
Qty: 90 | Refills: 0 | Status: ACTIVE | COMMUNITY
Start: 2022-01-07 | End: 1900-01-01

## 2023-12-05 ENCOUNTER — APPOINTMENT (OUTPATIENT)
Dept: ORTHOPEDIC SURGERY | Facility: CLINIC | Age: 53
End: 2023-12-05

## 2024-02-15 ENCOUNTER — APPOINTMENT (OUTPATIENT)
Dept: PAIN MANAGEMENT | Facility: CLINIC | Age: 54
End: 2024-02-15

## 2024-03-07 ENCOUNTER — APPOINTMENT (OUTPATIENT)
Dept: PAIN MANAGEMENT | Facility: CLINIC | Age: 54
End: 2024-03-07

## 2024-03-12 ENCOUNTER — APPOINTMENT (OUTPATIENT)
Dept: PAIN MANAGEMENT | Facility: CLINIC | Age: 54
End: 2024-03-12
Payer: OTHER MISCELLANEOUS

## 2024-03-12 DIAGNOSIS — G90.511 COMPLEX REGIONAL PAIN SYNDROME I OF RIGHT UPPER LIMB: ICD-10-CM

## 2024-03-12 PROCEDURE — 99214 OFFICE O/P EST MOD 30 MIN: CPT

## 2024-03-12 RX ORDER — GABAPENTIN 600 MG/1
600 TABLET, COATED ORAL
Qty: 60 | Refills: 5 | Status: ACTIVE | COMMUNITY
Start: 2022-11-09 | End: 1900-01-01

## 2024-03-13 PROBLEM — G90.511: Status: ACTIVE | Noted: 2022-07-19

## 2024-03-13 NOTE — HISTORY OF PRESENT ILLNESS
[FreeTextEntry1] : ORIGINAL PRESENTATION: Ms. Amaya had a work-related injury on May 7, 2013, where she worked as a , her hand went numb. She had carpal tunnel release surgery. Since then she had surgery on the right hand and developed complex regional syndrome and now has left hand numbness due to carpal tunnel, that is chronic. She has chronic neuropathy in her left hand and chronic complex regional pain syndrome, difficult problem.   She recently initiated therapy with medical marijuana sublingual 0.5ml once a day, she notes improved sleeping habits. Minor relief with pain but it is getting better.  ACTIVITIES: No real problems with walking, sitting, or standing. Never lies down. Uses no assistive device. Has difficultly performing household chores, doing shopping, socializing with friends, participating in activities and exercising.  TODAY: Patient last seen on 8/03/23. She is contacted via phone call today regarding right upper extremity pain secondary to complex regional pain syndrome. She reports no acute changes to her condition. She previously managed her pain medically with Amitriptyline, Cymbalta and Lyrica which failed to provide relief. She did find relief with gabapentin 300mg TID, however, she does not feel it has been able to control her pain adequately at nighttime. We discussed trialing gabapentin 600 mg TID as an alternative and she is agreeable. She utilizes medical marijuana which provides her relief throughout the day and allows for adequate sleep.   Of note she was recently diagnosed with osteoporosis and put on fosamax.   UDS- 8/4/22  TODAY, 3-: Revisit encounter. This is a former Dr. Soni patient.  She has chronic pain stemming from CRPS which was caused by surgical correction. She has ongoing burning type pains in her right hand starting at the fingers and referred upwards into the arm. She has skin color changes along with temperature changes in her hand. She also cannot bend her hand at all. She has undergone multiple different modalities including a cervical spinal column stimulator which was not beneficial. She is currently managing her pain with Gabapentin 300 mg TID which only affords her about 20% improvement in pain. She was prescribed medical marijuana as well which she stopped taking due to it making her nauseous.

## 2024-03-13 NOTE — DISCUSSION/SUMMARY
[de-identified] : A discussion regarding available pain management treatment options occurred with the patient. These included interventional, rehabilitative, pharmacological, and alternative modalities. We will proceed with the following:   Interventional treatment options; - None indicated at this time   Medications: 1. Renewed Gabapentin 600 mg BID (with 6 refills)  Gabapentin. Potential side effects were discussed which included dizziness, sedation, and pedal edema to name a few. If the patient cannot tolerate these side effects should they occur, then they will stop the medication. If the patient experiences sedation, they understand that they should not drive. The usage of the medication was discussed and the patient understands that it is an anti-epileptic medication used for neuropathic pain and that the pain relief from this medication will likely be subtle, and that hopefully in combination with the other treatments we are offering we will be able to get some additive relief.   - Follow up in 6 months.   I Shayy Mac attest that this documentation has been prepared under the direction and in the presence of provider Dr. Donaldo Adkins.  The documentation recorded by the scribe in my presence, accurately reflects the service I personally performed, and the decisions made by me with my edits as appropriate.   Donaldo Adkins, DO

## 2024-09-12 ENCOUNTER — APPOINTMENT (OUTPATIENT)
Dept: PAIN MANAGEMENT | Facility: CLINIC | Age: 54
End: 2024-09-12

## 2024-10-10 ENCOUNTER — APPOINTMENT (OUTPATIENT)
Dept: PAIN MANAGEMENT | Facility: CLINIC | Age: 54
End: 2024-10-10
Payer: OTHER MISCELLANEOUS

## 2024-10-10 DIAGNOSIS — G90.50 COMPLEX REGIONAL PAIN SYNDROME I, UNSPECIFIED: ICD-10-CM

## 2024-10-10 PROCEDURE — 99212 OFFICE O/P EST SF 10 MIN: CPT

## 2025-02-04 ENCOUNTER — APPOINTMENT (OUTPATIENT)
Dept: PAIN MANAGEMENT | Facility: CLINIC | Age: 55
End: 2025-02-04
Payer: OTHER MISCELLANEOUS

## 2025-02-04 VITALS — BODY MASS INDEX: 15.09 KG/M2 | HEIGHT: 62 IN | WEIGHT: 82 LBS

## 2025-02-04 DIAGNOSIS — G90.50 COMPLEX REGIONAL PAIN SYNDROME I, UNSPECIFIED: ICD-10-CM

## 2025-02-04 DIAGNOSIS — Z86.59 PERSONAL HISTORY OF OTHER MENTAL AND BEHAVIORAL DISORDERS: ICD-10-CM

## 2025-02-04 PROCEDURE — 99214 OFFICE O/P EST MOD 30 MIN: CPT

## 2025-05-06 ENCOUNTER — APPOINTMENT (OUTPATIENT)
Dept: PAIN MANAGEMENT | Facility: CLINIC | Age: 55
End: 2025-05-06

## 2025-06-03 ENCOUNTER — APPOINTMENT (OUTPATIENT)
Dept: PAIN MANAGEMENT | Facility: CLINIC | Age: 55
End: 2025-06-03

## 2025-06-17 ENCOUNTER — APPOINTMENT (OUTPATIENT)
Dept: PAIN MANAGEMENT | Facility: CLINIC | Age: 55
End: 2025-06-17

## 2025-06-24 ENCOUNTER — APPOINTMENT (OUTPATIENT)
Dept: PAIN MANAGEMENT | Facility: CLINIC | Age: 55
End: 2025-06-24

## 2025-06-24 PROCEDURE — 99214 OFFICE O/P EST MOD 30 MIN: CPT
